# Patient Record
(demographics unavailable — no encounter records)

---

## 2024-11-01 NOTE — HISTORY OF PRESENT ILLNESS
[Mother] : mother [Cow's milk ___ oz/feed] : [unfilled] oz of Cow's milk per feed [Table food] : table food [Normal] : Normal [Pacifier use] : Pacifier use [None] : Primary Fluoride Source: None [No] : No cigarette smoke exposure [Water heater temperature set at <120 degrees F] : Water heater temperature set at <120 degrees F [Car seat in back seat] : Car seat in back seat [Smoke Detectors] : Smoke detectors [Carbon Monoxide Detectors] : Carbon monoxide detectors [At risk for exposure to TB] : Not at risk for exposure to Tuberculosis

## 2024-11-01 NOTE — PHYSICAL EXAM
[Alert] : alert [Normocephalic] : normocephalic [Closed Anterior [place]] : closed anterior fontanelle [Red Reflex] : red reflex bilateral [PERRL] : PERRL [Normally Placed Ears] : normally placed ears [Auricles Well Formed] : auricles well formed [Clear Tympanic membranes] : clear tympanic membranes [Light reflex present] : light reflex present [Bony landmarks visible] : bony landmarks visible [Nares Patent] : nares patent [Palate Intact] : palate intact [Uvula Midline] : uvula midline [Tooth Eruption] : tooth eruption [Supple, full passive range of motion] : supple, full passive range of motion [Clear to Auscultation Bilaterally] : clear to auscultation bilaterally [Symmetric Chest Rise] : symmetric chest rise [Regular Rate and Rhythm] : regular rate and rhythm [S1, S2 present] : S1, S2 present [+2 Femoral Pulses] : (+) 2 femoral pulses [Soft] : soft [Bowel Sounds] : normoactive bowel sounds [Normal External Genitalia] : normal external genitalia [Normal Vaginal Introitus] : normal vaginal introitus [No Abnormal Lymph Nodes Palpated] : no abnormal lymph nodes palpated [Symmetric Abduction and Rotation of Hips] : symmetric abduction and rotation of hips [Straight] : straight [Cranial Nerves Grossly Intact] : cranial nerves grossly intact [Discharge] : no discharge [Palpable Masses] : no palpable masses [Murmurs] : no murmurs [Tender] : nontender [Distended] : nondistended [Hepatomegaly] : no hepatomegaly [Splenomegaly] : no splenomegaly [Clitoromegaly] : no clitoromegaly [Allis Sign] : negative Allis sign [Rash or Lesions] : no rash/lesions

## 2024-11-01 NOTE — DISCUSSION/SUMMARY
[Normal Growth] : growth [Normal Development] : development [None] : No known medical problems [No Elimination Concerns] : elimination [No Feeding Concerns] : feeding [No Skin Concerns] : skin [Normal Sleep Pattern] : sleep [Family Support] : family support [Establishing Routines] : establishing routines [Feeding and Appetite Changes] : feeding and appetite changes [Establishing A Dental Home] : establishing a dental home [Safety] : safety [No Medications] : ~He/She~ is not on any medications [Parent/Guardian] : parent/guardian [] : The components of the vaccine(s) to be administered today are listed in the plan of care. The disease(s) for which the vaccine(s) are intended to prevent and the risks have been discussed with the caretaker.  The risks are also included in the appropriate vaccination information statements which have been provided to the patient's caregiver.  The caregiver has given consent to vaccinate. [FreeTextEntry1] : Transition to whole cow's milk. Continue table foods, 3 meals with 2-3 snacks per day. Incorporate up to 6 oz of fluorinated water daily in a sippy cup. Brush teeth twice a day with soft toothbrush. Recommend visit to dentist. When in car, keep child in rear-facing car seats until age 2, or until  the maximum height and weight for seat is reached. Put baby to sleep in own crib with no loose or soft bedding. Lower crib mattress. Help baby to maintain consistent daily routines and sleep schedule. Recognize stranger and separation anxiety. Ensure home is safe since baby is increasingly mobile. Be within arm's reach of baby at all times. Use consistent, positive discipline. Avoid screen time. Read aloud to baby. Script given for CBC, Lead Next PE at 15 months of age will return for flu vaccine

## 2024-12-17 NOTE — PHYSICAL EXAM
[Cervical Lymph Nodes Enlarged Posterior Bilaterally] : posterior cervical [Cervical Lymph Nodes Enlarged Anterior Bilaterally] : anterior cervical [Normal] : PERRL, extraocular movements intact, cranial nerves II-XII grossly intact

## 2024-12-17 NOTE — HISTORY OF PRESENT ILLNESS
[Solid Foods] : eating solid foods [___ juvencio/ounce] : [unfilled] juvencio/ounce [de-identified] : Sarah is a 13 month old female presenting today for an ANC of 0.93 found on routine labs in the PCP office on 11/12/24. The labs were repeated on 11/26/24 with an ANC of 0.86. Today her ANC was found to be 0.75.   Parents report Sarah is doing well today, has had an on and off runny nose.   Mother reports she was full-term, uncomplicated birth that did not require NICU stay. She did have cauterization of her umbilical stump, but no omphalitis. No hospital stays requiring antibiotics, pneumonia or sepsis reported. She was formula fed as an infant and at 12 months transitioned to 12-16 oz of whole cow's milk and table foods. Mom reports she eats a well-varied diet, including vegetables, fruits and meats.   Mom reports a history of anemia, never having taken iron. 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 Dad reports no significant past medical history. Both mom and dad are of  ethnicity.   Sarah does not take any prescription medications. They do report giving her a daily multivitamin.  [de-identified] : whole cow's milk

## 2024-12-17 NOTE — PAST MEDICAL HISTORY
[At Term] : at term [Normal Vaginal Route] : by normal vaginal route [None] : there were no delivery complications [Age Appropriate] : age appropriate  [Jaundice] : not jaundice [Phototherapy] : no phototherapy [Transfusion] : no transfusion [Exchange Transfusion] : no exchange transfusion [NICU] : no NICU

## 2024-12-17 NOTE — HISTORY OF PRESENT ILLNESS
[Solid Foods] : eating solid foods [___ juvencio/ounce] : [unfilled] juvencio/ounce [de-identified] : Sarah is a 13 month old female presenting today for an ANC of 0.93 found on routine labs in the PCP office on 11/12/24. The labs were repeated on 11/26/24 with an ANC of 0.86. Today her ANC was found to be 0.75.   Parents report Sarah is doing well today, has had an on and off runny nose.   Mother reports she was full-term, uncomplicated birth that did not require NICU stay. She did have cauterization of her umbilical stump, but no omphalitis. No hospital stays requiring antibiotics, pneumonia or sepsis reported. She was formula fed as an infant and at 12 months transitioned to 12-16 oz of whole cow's milk and table foods. Mom reports she eats a well-varied diet, including vegetables, fruits and meats.   Mom reports a history of anemia, never having taken iron. 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 Dad reports no significant past medical history. Both mom and dad are of  ethnicity.   Sarah does not take any prescription medications. They do report giving her a daily multivitamin.  [de-identified] : whole cow's milk

## 2024-12-17 NOTE — RESULTS/DATA
[FreeTextEntry1] : Peripheral blood smear reviewed with Dr. Alvarado:   RBCs: Possible slight hypochromia. Otherwise normal morphology.    WBCs: Normal morphology. No blasts visualized.   Platelets: Normal morphology.

## 2025-01-31 NOTE — HISTORY OF PRESENT ILLNESS
[Mother] : mother [Father] : father [Cow's milk (Ounces per day ___)] : consumes [unfilled] oz of cow's milk per day [Fruit] : fruit [Vegetables] : vegetables [Meat] : meat [Cereal] : cereal [Eggs] : eggs [Finger Foods] : finger foods [Table food] : table food [Normal] : Normal [Vitamin] : Primary Fluoride Source: Vitamin [No] : No cigarette smoke exposure [Water heater temperature set at <120 degrees F] : Water heater temperature set at <120 degrees F [Car seat in back seat] : Car seat in back seat [Carbon Monoxide Detectors] : Carbon monoxide detectors [Smoke Detectors] : Smoke detectors

## 2025-01-31 NOTE — DEVELOPMENTAL MILESTONES
[Normal Development] : Normal Development [None] : none [Imitates scribbling] : imitates scribbling [Drinks from cup with little] : drinks from cup with little spilling [Points to ask for something] : points to ask for something or to get help [Uses 3 words other than names] : uses 3 words other than names [Speaks in sounds that seem like] : speaks in sounds that seem like an unknown language [Follows directions that do not] : follows direction that do not include a gesture [Looks when parent says,] : looks when parent says, "Where is...?" [Squats to  objects] : squats to  objects [Crawls up a few steps] : crawls up a few steps [Begins to run] : begins to run [Makes parish with crayon] : makes parish with arielyon [Drops object into and takes object] : drops object into and takes object out of container

## 2025-01-31 NOTE — DISCUSSION/SUMMARY
[Normal Growth] : growth [Normal Development] : development [None] : No known medical problems [No Elimination Concerns] : elimination [No Feeding Concerns] : feeding [No Skin Concerns] : skin [Normal Sleep Pattern] : sleep [Communication and Social Development] : communication and social development [Sleep Routines and Issues] : sleep routines and issues [Temper Tantrums and Discipline] : temper tantrums and discipline [Healthy Teeth] : healthy teeth [Safety] : safety [No Medications] : ~He/She~ is not on any medications [Parent/Guardian] : parent/guardian [] : The components of the vaccine(s) to be administered today are listed in the plan of care. The disease(s) for which the vaccine(s) are intended to prevent and the risks have been discussed with the caretaker.  The risks are also included in the appropriate vaccination information statements which have been provided to the patient's caregiver.  The caregiver has given consent to vaccinate. [FreeTextEntry1] : Continue whole cow's milk. Continue table foods, 3 meals with 2-3 snacks per day. Incorporate fluorinated water daily in a sippy cup. Brush teeth twice a day with soft toothbrush. Recommend visit to dentist. When in car, keep child in rear-facing car seats until age 2, or until  the maximum height and weight for seat is reached. Put baby to sleep in own crib. Lower crib mattress. Help baby to maintain consistent daily routines and sleep schedule. Recognize stranger and separation anxiety. Ensure home is safe since baby is increasingly mobile. Be within arm's reach of baby at all times. Use consistent, positive discipline. Read aloud to baby. Next PE at 18 months of age H/o physiologic pericardial effusion - has f/u summer 2025 Benign neutropenia- f/b heme/onc

## 2025-01-31 NOTE — PHYSICAL EXAM
[Alert] : alert [No Acute Distress] : no acute distress [Normocephalic] : normocephalic [Anterior [place] Closed] : anterior fontanelle closed [Red Reflex Bilateral] : red reflex bilateral [PERRL] : PERRL [Normally Placed Ears] : normally placed ears [Auricles Well Formed] : auricles well formed [Clear Tympanic membranes with present light reflex and bony landmarks] : clear tympanic membranes with present light reflex and bony landmarks [No Discharge] : no discharge [Nares Patent] : nares patent [Palate Intact] : palate intact [Uvula Midline] : uvula midline [Tooth Eruption] : tooth eruption  [Supple, full passive range of motion] : supple, full passive range of motion [No Palpable Masses] : no palpable masses [Symmetric Chest Rise] : symmetric chest rise [Clear to Auscultation Bilaterally] : clear to auscultation bilaterally [Regular Rate and Rhythm] : regular rate and rhythm [S1, S2 present] : S1, S2 present [No Murmurs] : no murmurs [+2 Femoral Pulses] : +2 femoral pulses [Soft] : soft [NonTender] : non tender [Non Distended] : non distended [Normoactive Bowel Sounds] : normoactive bowel sounds [No Hepatomegaly] : no hepatomegaly [No Splenomegaly] : no splenomegaly [Jefe 1] : Jefe 1 [No Clitoromegaly] : no clitoromegaly [Normal Vaginal Introitus] : normal vaginal introitus [Patent] : patent [Normally Placed] : normally placed [No Abnormal Lymph Nodes Palpated] : no abnormal lymph nodes palpated [No Clavicular Crepitus] : no clavicular crepitus [Negative Houser-Ortalani] : negative Houser-Ortalani [Symmetric Buttocks Creases] : symmetric buttocks creases [No Spinal Dimple] : no spinal dimple [NoTuft of Hair] : no tuft of hair [Cranial Nerves Grossly Intact] : cranial nerves grossly intact [No Rash or Lesions] : no rash or lesions

## 2025-04-25 NOTE — DISCUSSION/SUMMARY
[Normal Growth] : growth [Normal Development] : development [None] : No known medical problems [No Elimination Concerns] : elimination [No Feeding Concerns] : feeding [No Skin Concerns] : skin [Normal Sleep Pattern] : sleep [Family Support] : family support [Child Development and Behavior] : child development and behavior [Language Promotion/Hearing] : language promotion/hearing [Toliet Training Readiness] : toliet training readiness [Safety] : safety [No Medications] : ~He/She~ is not on any medications [Parent/Guardian] : parent/guardian [] : The components of the vaccine(s) to be administered today are listed in the plan of care. The disease(s) for which the vaccine(s) are intended to prevent and the risks have been discussed with the caretaker.  The risks are also included in the appropriate vaccination information statements which have been provided to the patient's caregiver.  The caregiver has given consent to vaccinate. [FreeTextEntry1] : Continue whole cow's milk. Continue table foods, 3 meals with 2-3 snacks per day. Incorporate fluorinated water daily in a sippy cup. Brush teeth twice a day with soft toothbrush. Recommend visit to dentist. When in car, keep child in rear-facing car seats until age 2, or until  the maximum height and weight for seat is reached. Put toddler to sleep in own bed or crib. Help toddler to maintain consistent daily routines and sleep schedule. Toilet training discussed. Recognize anxiety in new settings. Ensure home is safe. Be within arm's reach of toddler at all times. Use consistent, positive discipline. Read aloud to toddler. 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

## 2025-04-25 NOTE — PHYSICAL EXAM

## 2025-04-25 NOTE — HISTORY OF PRESENT ILLNESS
[Mother] : mother [Cow's milk (Ounces per day ___)] : consumes [unfilled] oz of Cow's milk per day [Table food] : table food [Normal] : Normal [Vitamin] : Primary Fluoride Source: Vitamin [No] : No cigarette smoke exposure [Water heater temperature set at <120 degrees F] : Water heater temperature set at <120 degrees F [Car seat in back seat] : Car seat in back seat [Carbon Monoxide Detectors] : Carbon monoxide detectors [Smoke Detectors] : Smoke detectors [FreeTextEntry7] : f/b hematology for neutropenia  [FreeTextEntry1] : 18 months old well visit

## 2025-07-30 NOTE — CARDIOLOGY SUMMARY
[Today's Date] : [unfilled] [FreeTextEntry1] : Normal Sinus Rhythm Normal Axis QTc 424-433 ms [FreeTextEntry2] : Summary: 1. Normal left ventricular size, morphology and systolic function. 2. Trivial tricuspid valve regurgitation, peak systolic instantaneous gradient 12.1 mmHg. 3. Physiologic pericardial effusion.

## 2025-07-30 NOTE — PHYSICAL EXAM
[General Appearance - Alert] : alert [General Appearance - In No Acute Distress] : in no acute distress [General Appearance - Well Nourished] : well nourished [General Appearance - Well Developed] : well developed [General Appearance - Well-Appearing] : well appearing [Appearance Of Head] : the head was normocephalic [Facies] : there were no dysmorphic facial features [Sclera] : the conjunctiva were normal [EOMI] : ~T the extraocular movements were intact [PERRL With Normal Accommodation] : the pupils were equal in size, round, and reactive to light [Outer Ear] : the ears and nose were normal in appearance [Nasal Cavity] : the nasal mucosa was normal [Examination Of The Oral Cavity] : mucous membranes were moist and pink [Oropharynx] : the oropharynx was normal [No Cough] : no cough [Auscultation Breath Sounds / Voice Sounds] : breath sounds clear to auscultation bilaterally [Stridor] : no stridor was observed [Respiration, Rhythm And Depth] : normal respiratory rhythm and effort [Normal Chest Appearance] : the chest was normal in appearance [Apical Impulse] : quiet precordium with normal apical impulse [Heart Rate And Rhythm] : normal heart rate and rhythm [Heart Sounds] : normal S1 and S2 [Heart Sounds Gallop] : no gallops [Heart Sounds Pericardial Friction Rub] : no pericardial rub [Edema] : no edema [Arterial Pulses] : normal upper and lower extremity pulses with no pulse delay [Heart Sounds Click] : no clicks [Capillary Refill Test] : normal capillary refill [Bowel Sounds] : normal bowel sounds [Abdomen Soft] : soft [Nondistended] : nondistended [Abdomen Tenderness] : non-tender [Nail Clubbing] : no clubbing  or cyanosis of the fingers [Motor Tone] : normal muscle strength and tone [Cervical Lymph Nodes Enlarged Anterior] : The anterior cervical nodes were normal [Cervical Lymph Nodes Enlarged Posterior] : The posterior cervical nodes were normal [] : no rash [Skin Lesions] : no lesions [Skin Turgor] : normal turgor [Demonstrated Behavior - Infant Nonreactive To Parents] : interactive [Mood] : mood and affect were appropriate for age [Demonstrated Behavior] : normal behavior [No Murmur] : no murmurs  [Skin Color & Pigmentation] : normal skin color and pigmentation [FreeTextEntry7] : I did not hear murmur today

## 2025-07-30 NOTE — HISTORY OF PRESENT ILLNESS
[FreeTextEntry1] : MARGO presented for follow up on Jul 28, 2025.  She was last seen on Jul 28, 2025 She is now 22 months old. When she was 5 months old, she was referred for cardiology consultation due to a heart murmur.  The murmur was first diagnosed during a routine pediatric visit on Mar 5, 2024.  She was diagnosed with: -She had a Physiologic pericardial effusion. This will be rechecked in 3 months. -A tiny Patent Ductus Arteriosus is not ruled out. -She had the incidental finding of mitral insufficiency. The insufficiency did not appear to be hemodynamically significant She has been relatively well since last visit. 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 There are no symptoms of dyspnea on exertion, easy fatigability, excessive sweating, feeding difficulties, difficulty gaining weight or syncopal episodes. There were no unexplained fevers, rashes or hematuria. MARGO was born at term after an uneventful pregnancy.  She was discharged with her mother.   She was never admitted to the hospital overnight.   Mom is healthy. Dad is healthy. There are no siblings. Importantly, there is no family history of recurrent syncope, premature sudden death, cardiomyopathy, arrhythmia, drowning, or unexplained accidental deaths.

## 2025-07-30 NOTE — CONSULT LETTER
[Today's Date] : [unfilled] [Name] : Name: [unfilled] [] : : ~~ [Today's Date:] : [unfilled] [Dear  ___:] : Dear Dr. [unfilled]: [Consult] : I had the pleasure of evaluating your patient, [unfilled]. My full evaluation follows. [Consult - Single Provider] : Thank you very much for allowing me to participate in the care of this patient. If you have any questions, please do not hesitate to contact me. [Sincerely,] : Sincerely, [FreeTextEntry4] : Lauri Johnson MD [FreeTextEntry5] : [address] [FreeTextEntry6] : [place] [address] [de-identified] : Barry E. Goldberg, MD FACC, FAAP, FACE [facility] Chief Pediatric Cardiology

## 2025-07-30 NOTE — CONSULT LETTER
[Today's Date] : [unfilled] [Name] : Name: [unfilled] [] : : ~~ [Today's Date:] : [unfilled] [Dear  ___:] : Dear Dr. [unfilled]: [Consult] : I had the pleasure of evaluating your patient, [unfilled]. My full evaluation follows. [Consult - Single Provider] : Thank you very much for allowing me to participate in the care of this patient. If you have any questions, please do not hesitate to contact me. [Sincerely,] : Sincerely, [FreeTextEntry4] : Lauri Johnson MD [FreeTextEntry5] : [address] [FreeTextEntry6] : [place] [address] [de-identified] : Barry E. Goldberg, MD FACC, FAAP, FACE [facility] Chief Pediatric Cardiology

## 2025-07-30 NOTE — DISCUSSION/SUMMARY
[May participate in all age-appropriate activities] : [unfilled] May participate in all age-appropriate activities. [Influenza vaccine is recommended] : Influenza vaccine is recommended [FreeTextEntry1] : MARGO's  workup revealed: -The murmur has resolved -She had a Physiologic pericardial effusion. This was unchanged -The Patent Ductus Arteriosus was not seen -She  had the incidental finding of trivial tricuspid insufficiency. Trivial tricuspid insufficiency is a common finding, considered a physiologic variant of normal and  allowed us to calculate estimated pulmonary artery pressures as normal.  She should return to cardiology if  a new murmur is heard or there are any symptoms possibly related to a cardiac etiology. She does not require any restrictions from a cardiac standpoint. She does not require antibiotic prophylaxis from a cardiac standpoint.  She should return to hematology to complete her work up.  She should continue with her   routine pediatric care.    [Needs SBE Prophylaxis] : [unfilled] does not need bacterial endocarditis prophylaxis